# Patient Record
Sex: FEMALE | Race: BLACK OR AFRICAN AMERICAN | NOT HISPANIC OR LATINO | Employment: FULL TIME | URBAN - METROPOLITAN AREA
[De-identification: names, ages, dates, MRNs, and addresses within clinical notes are randomized per-mention and may not be internally consistent; named-entity substitution may affect disease eponyms.]

---

## 2022-11-15 PROBLEM — E66.813 CLASS 3 SEVERE OBESITY WITH SERIOUS COMORBIDITY AND BODY MASS INDEX (BMI) OF 45.0 TO 49.9 IN ADULT: Status: ACTIVE | Noted: 2022-11-15

## 2022-11-15 PROBLEM — R00.0 TACHYCARDIA: Status: ACTIVE | Noted: 2022-11-15

## 2022-11-15 PROBLEM — R06.02 SHORTNESS OF BREATH: Status: ACTIVE | Noted: 2022-11-15

## 2022-11-15 PROBLEM — J45.901 ASTHMA EXACERBATION: Status: ACTIVE | Noted: 2022-11-15

## 2022-11-15 PROBLEM — J06.9 UPPER RESPIRATORY TRACT INFECTION: Status: ACTIVE | Noted: 2022-11-15

## 2022-11-15 PROBLEM — E66.01 CLASS 3 SEVERE OBESITY WITH SERIOUS COMORBIDITY AND BODY MASS INDEX (BMI) OF 45.0 TO 49.9 IN ADULT: Status: ACTIVE | Noted: 2022-11-15

## 2022-11-15 PROBLEM — J96.01 ACUTE RESPIRATORY FAILURE WITH HYPOXIA: Status: ACTIVE | Noted: 2022-11-15

## 2022-11-15 PROBLEM — I10 HYPERTENSION: Status: ACTIVE | Noted: 2022-11-15

## 2022-11-16 PROBLEM — R06.02 SHORTNESS OF BREATH: Status: RESOLVED | Noted: 2022-11-15 | Resolved: 2022-11-16

## 2022-11-16 PROBLEM — R00.0 TACHYCARDIA: Status: RESOLVED | Noted: 2022-11-15 | Resolved: 2022-11-16

## 2022-11-16 PROBLEM — Z71.6 ENCOUNTER FOR TOBACCO USE CESSATION COUNSELING: Status: ACTIVE | Noted: 2022-11-16

## 2022-11-16 PROBLEM — Z72.0 TOBACCO ABUSE: Status: ACTIVE | Noted: 2022-11-16

## 2022-12-19 PROBLEM — H60.90 OTITIS EXTERNA: Status: ACTIVE | Noted: 2022-12-19

## 2022-12-27 PROBLEM — J45.21 MILD INTERMITTENT ASTHMA WITH ACUTE EXACERBATION: Status: ACTIVE | Noted: 2022-12-27

## 2023-02-20 PROBLEM — J96.01 ACUTE RESPIRATORY FAILURE WITH HYPOXIA: Status: RESOLVED | Noted: 2022-11-15 | Resolved: 2023-02-20

## 2023-03-07 PROBLEM — R73.03 PREDIABETES: Status: ACTIVE | Noted: 2023-03-07

## 2023-03-07 PROBLEM — G62.9 NEUROPATHY: Status: ACTIVE | Noted: 2023-03-07

## 2023-08-14 PROBLEM — J45.30 MILD PERSISTENT ASTHMA WITHOUT COMPLICATION: Status: ACTIVE | Noted: 2017-12-19

## 2023-08-31 PROBLEM — J45.901 ASTHMA EXACERBATION: Status: RESOLVED | Noted: 2022-11-15 | Resolved: 2023-08-31

## 2023-08-31 PROBLEM — Z87.891 FORMER SMOKER: Status: ACTIVE | Noted: 2023-08-31

## 2023-08-31 PROBLEM — J45.21 MILD INTERMITTENT ASTHMA WITH ACUTE EXACERBATION: Status: RESOLVED | Noted: 2022-12-27 | Resolved: 2023-08-31

## 2023-08-31 PROBLEM — Z91.09 ENVIRONMENTAL ALLERGIES: Status: ACTIVE | Noted: 2023-08-31

## 2024-01-25 ENCOUNTER — PATIENT MESSAGE (OUTPATIENT)
Dept: ADMINISTRATIVE | Facility: HOSPITAL | Age: 49
End: 2024-01-25

## 2024-02-06 ENCOUNTER — PATIENT OUTREACH (OUTPATIENT)
Dept: ADMINISTRATIVE | Facility: HOSPITAL | Age: 49
End: 2024-02-06

## 2024-04-07 ENCOUNTER — PATIENT MESSAGE (OUTPATIENT)
Dept: ADMINISTRATIVE | Facility: HOSPITAL | Age: 49
End: 2024-04-07

## 2024-05-10 PROBLEM — J32.9 CHRONIC RHINOSINUSITIS: Status: ACTIVE | Noted: 2024-05-10

## 2024-05-10 PROBLEM — J35.9 LESION OF TONSIL: Status: ACTIVE | Noted: 2024-05-10

## 2024-05-10 PROBLEM — J34.3 NASAL TURBINATE HYPERTROPHY: Status: ACTIVE | Noted: 2024-05-10

## 2024-08-20 ENCOUNTER — PATIENT MESSAGE (OUTPATIENT)
Dept: ADMINISTRATIVE | Facility: HOSPITAL | Age: 49
End: 2024-08-20

## 2024-09-16 ENCOUNTER — PATIENT OUTREACH (OUTPATIENT)
Dept: ADMINISTRATIVE | Facility: HOSPITAL | Age: 49
End: 2024-09-16

## 2024-10-24 ENCOUNTER — PATIENT OUTREACH (OUTPATIENT)
Dept: ADMINISTRATIVE | Facility: HOSPITAL | Age: 49
End: 2024-10-24

## 2024-10-25 PROBLEM — F41.9 ANXIETY: Status: ACTIVE | Noted: 2024-10-25

## 2024-10-25 PROBLEM — M54.16 LUMBAR BACK PAIN WITH RADICULOPATHY AFFECTING LOWER EXTREMITY: Status: ACTIVE | Noted: 2024-10-25

## 2024-10-25 PROBLEM — K21.9 GERD (GASTROESOPHAGEAL REFLUX DISEASE): Status: ACTIVE | Noted: 2024-10-25

## 2024-11-19 ENCOUNTER — PATIENT MESSAGE (OUTPATIENT)
Dept: ADMINISTRATIVE | Facility: HOSPITAL | Age: 49
End: 2024-11-19

## 2025-03-10 PROBLEM — G47.33 OSA (OBSTRUCTIVE SLEEP APNEA): Status: ACTIVE | Noted: 2025-03-10

## 2025-03-10 PROBLEM — J30.89 ALLERGIC RHINITIS DUE TO DUST MITE: Status: ACTIVE | Noted: 2025-03-10

## 2025-03-11 PROBLEM — E66.9 OBESITY WITH SERIOUS COMORBIDITY: Status: ACTIVE | Noted: 2025-03-11

## 2025-03-11 PROBLEM — E66.813 CLASS 3 SEVERE OBESITY WITH SERIOUS COMORBIDITY AND BODY MASS INDEX (BMI) OF 50.0 TO 59.9 IN ADULT: Status: ACTIVE | Noted: 2025-03-11

## 2025-03-11 PROBLEM — E66.01 CLASS 3 SEVERE OBESITY WITH SERIOUS COMORBIDITY AND BODY MASS INDEX (BMI) OF 50.0 TO 59.9 IN ADULT: Status: ACTIVE | Noted: 2025-03-11

## 2025-04-24 PROBLEM — G89.29 CHRONIC RIGHT SHOULDER PAIN: Status: ACTIVE | Noted: 2025-04-24

## 2025-04-24 PROBLEM — M25.511 CHRONIC RIGHT SHOULDER PAIN: Status: ACTIVE | Noted: 2025-04-24

## 2025-05-26 ENCOUNTER — TELEPHONE (OUTPATIENT)
Dept: ADMINISTRATIVE | Facility: HOSPITAL | Age: 50
End: 2025-05-26

## 2025-05-26 ENCOUNTER — PATIENT OUTREACH (OUTPATIENT)
Dept: ADMINISTRATIVE | Facility: HOSPITAL | Age: 50
End: 2025-05-26

## 2025-05-26 NOTE — PROGRESS NOTES
5-26-25 follow up on campaign msg regarding colorectal screening, spoke with pt, msg sent to Endo

## 2025-06-04 ENCOUNTER — CLINICAL SUPPORT (OUTPATIENT)
Dept: NUTRITION | Facility: CLINIC | Age: 50
End: 2025-06-04
Payer: MEDICAID

## 2025-06-04 DIAGNOSIS — E66.01 MORBID OBESITY WITH BMI OF 50.0-59.9, ADULT: ICD-10-CM

## 2025-06-04 PROCEDURE — 97802 MEDICAL NUTRITION INDIV IN: CPT | Mod: 95,,,

## 2025-07-07 ENCOUNTER — CLINICAL SUPPORT (OUTPATIENT)
Dept: NUTRITION | Facility: CLINIC | Age: 50
End: 2025-07-07
Payer: MEDICAID

## 2025-07-07 DIAGNOSIS — E66.01 MORBID OBESITY WITH BMI OF 50.0-59.9, ADULT: Primary | ICD-10-CM

## 2025-07-07 PROCEDURE — 97803 MED NUTRITION INDIV SUBSEQ: CPT | Mod: 95,,,

## 2025-07-07 NOTE — PROGRESS NOTES
"Nutrition Assessment    Visit Type: Insurance follow-up  Session Time:  30 Minutes  Reason for MNT visit: Pt in for education and nutrition counseling regarding HTN and Obesity.       Age: 49 y.o.  Wt: 259 #s  Wt Readings from Last 1 Encounters:   06/24/25 116.1 kg (256 lb)      Ht:   Ht Readings from Last 1 Encounters:   06/24/25 4' 8" (1.422 m)     BMI:   BMI Readings from Last 1 Encounters:   06/24/25 57.39 kg/m²                       Wt Readings from Last 10 Encounters:   06/24/25 116.1 kg (256 lb)   06/16/25 115.8 kg (255 lb 6.4 oz)   05/29/25 118.4 kg (261 lb)   05/22/25 118.6 kg (261 lb 6.4 oz)   05/02/25 117.9 kg (259 lb 14.8 oz)   03/11/25 117.9 kg (260 lb)   03/10/25 118.4 kg (261 lb)   02/21/25 119.3 kg (263 lb)   01/06/25 119.3 kg (263 lb)   01/02/25 119.3 kg (263 lb)       Client states:    Healthy lifestyle and weight loss   Last year gained 30 pounds due to lack of physical activity (back issues)   Recent procedure thats now relieving pain   Goal weight: 200 pounds   On metformin//not on zepbound (awaiting medication)      7/7:   Lost 6 pounds   5k steps per day- maximum due to back   States has been mindful of portions  Started orlistat//awaiting GLP-1  Limiting added sugar/saturated   Stated that she is having difficulty affording food//does not qualify for SNAP//discussed local food pantries     Medical History  Problem List           Resolved    Hypertension         Upper respiratory tract infection         Tobacco abuse         Encounter for tobacco use cessation counseling         Otitis externa         Pre-diabetes         Neuropathy         Mild persistent asthma without complication         Environmental allergies         Former smoker         Chronic rhinosinusitis         Nasal turbinate hypertrophy         Lesion of tonsil         Lumbar back pain with radiculopathy affecting lower extremity         Anxiety         GERD (gastroesophageal reflux disease)         IRVIN (obstructive sleep " apnea)         Allergic rhinitis due to dust mite         Class 3 severe obesity with serious comorbidity and body mass index (BMI) of 50.0 to 59.9 in adult         Chronic right shoulder pain            Past Medical History:   Diagnosis Date    Asthma     Diabetes mellitus     Hypertension     Sleep apnea, unspecified        Past Surgical History:   Procedure Laterality Date    FESS, WITH NASAL TURBINATE REDUCTION Bilateral 5/10/2024    Procedure: FESS, WITH NASAL TURBINATE REDUCTION;  Surgeon: Favian Henry MD;  Location: [facility];  Service: ENT;  Laterality: Bilateral;    hysterectomy      partial    INJECTION OF ANESTHETIC AGENT AROUND MEDIAL BRANCH NERVES INNERVATING LUMBAR FACET JOINT Bilateral 12/20/2024    Procedure: Block-nerve-medial branch-lumbar;  Surgeon: Estelle Jimenez MD;  Location: [facility] PERIOP PROCEDURES;  Service: Pain Management;  Laterality: Bilateral;    INJECTION OF ANESTHETIC AGENT AROUND MEDIAL BRANCH NERVES INNERVATING LUMBAR FACET JOINT Bilateral 2/21/2025    Procedure: Block-nerve-medial branch-lumbar;  Surgeon: Estelle Jimenez MD;  Location: [facility] PERIOP PROCEDURES;  Service: Pain Management;  Laterality: Bilateral;  L3-5    RADIOFREQUENCY ABLATION OF LUMBAR MEDIAL BRANCH NERVE AT SINGLE LEVEL Bilateral 5/2/2025    Procedure: Radiofrequency Ablation, Nerve, Spinal, Lumbar, Medial Branch, 1 Level;  Surgeon: Estelle Jimenez MD;  Location: [facility] PERIOP PROCEDURES;  Service: Pain Management;  Laterality: Bilateral;  L3-5        Lab Reports:  Reviewed and noted     Lab Results   Component Value Date    TSH 0.873 06/04/2024    AST 29 04/04/2024    ALT 22 04/04/2024    HDL 44 06/24/2025    TRIG 174 (H) 06/24/2025    HGBA1C 6.1 (H) 01/02/2025    HGBA1C 6.1 (H) 02/21/2024    HGBA1C 5.9 (H) 11/21/2022      BP Readings from Last 1 Encounters:   07/01/25 125/71       Medications    Prior to Admission medications    Medication Sig Start Date End Date Taking? Authorizing  Provider   albuterol (PROVENTIL/VENTOLIN HFA) 90 mcg/actuation inhaler Inhale 2 puffs into the lungs every 4 (four) hours as needed for wheezing or shortness of breath. Rescue 3/11/25   Favian Tabares MD   azelastine (ASTELIN) 137 mcg (0.1 %) nasal spray 1 spray (137 mcg total) in each nostril 2 (two) times daily. 11/20/24 11/20/25  Sana Merino PA   blood pressure test kit-large Kit 1 Device by Misc.(Non-Drug; Combo Route) route once daily. 1/2/25   Favian Tabares MD   busPIRone (BUSPAR) 10 MG tablet Take 1 tablet (10 mg total) by mouth 2 (two) times daily. 3/11/25 3/11/26  Favian Tabares MD   diclofenac sodium (VOLTAREN ARTHRITIS PAIN) 1 % Gel Apply 2 g topically 2 (two) times daily to affected area. 3/16/25 5/24/25  Favian Tabares MD   FLUoxetine 40 MG capsule Take 1 capsule (40 mg total) by mouth once daily. 3/11/25 3/11/26  Favian Tabares MD   fluticasone propion-salmeterol 115-21 mcg/dose (ADVAIR HFA) 115-21 mcg/actuation HFAA inhaler Inhale 2 puffs into the lungs every 12 (twelve) hours. Controller 3/11/25 3/11/26  Favian Tabares MD   fluticasone propionate (FLONASE) 50 mcg/actuation nasal spray 1 spray (50 mcg total) by Each Nostril route 2 (two) times a day.  Patient not taking: Reported on 3/11/2025 3/10/25   Nasim Max MD   hydroCHLOROthiazide 12.5 MG Tab Take 1 tablet (12.5 mg total) by mouth once daily. 3/11/25 3/11/26  Favian Tabares MD   metFORMIN (GLUCOPHAGE-XR) 500 MG ER 24hr tablet Take 1 tablet (500 mg total) by mouth daily with lunch. 3/11/25 3/11/26  Favian Tabares MD   methocarbamoL (ROBAXIN) 500 MG Tab Take 1 tablet (500 mg total) by mouth nightly as needed. 1/6/25   Thad Edmonds MD   omeprazole (PRILOSEC) 40 MG capsule Take 1 capsule (40 mg total) by mouth daily as needed (acid reflux). 1/2/25 7/1/25  Favian Tabares MD   orlistat (XENICAL) 120 mg capsule Take 1 capsule (120 mg total) by mouth 3 (three) times daily with meals. 4/16/25 7/15/25  Rayshawn  MD Favian   tirzepatide, weight loss, (ZEPBOUND) 5 mg/0.5 mL PnIj Inject 5 mg into the skin every 7 days. 4/8/25 6/3/25  Favian Tabares MD        Vitamins, Minerals, and/or Supplements:  Vitamin D, B12  Wants to incorporate more vitamins and minerals.     LIFESTYLE FACTORS    Sleep: good- 6 hours per night   Wears CPAP    Energy Level: Not great    Stress Level: high- life in general  Mom is sick   Not working currently (is ) - on employment    Support System:  parents    Exercise Regimen: lightly active (low intensity, 1-3 days a week)  Apartment gym: Walks at gym (almost everyday) with variable ups and down - 30 minutes  (likes rower, step stepper)    Social History    Marital status:  Single    Social History     Tobacco Use    Smoking status: Former     Current packs/day: 0.50     Average packs/day: 0.5 packs/day for 2.7 years (1.3 ttl pk-yrs)     Types: Cigarettes     Start date: 11/2022     Passive exposure: Past    Smokeless tobacco: Former   Substance Use Topics    Alcohol use: Not Currently     Current Alcohol use: Maybe wine every once in a whole       Meal preparation/shopping:          Dinning out: 1-2 x per week, mostly fast food, mostly take out    Food Allergies or Intolerances:  None      Food Preferences / Dietary Restrictions:  Like to incorporate plant-based sources  of protein//Doesn't eat pork      24-hour Recall:     Reviewed and noted during consultation     Coffee: sugar and cream (powdered creamer)   Recommend: Allulose//Agave     9a Breakfast: Omelette with low-fat cottage cheese + fruit// Non-fat or 2% Fage OR Chobani Zero Sugar + fruit + ha seeds + hard-boiled eggs//Smoothie- frozen kale + unsweetened plant based milk + frozen blueberries+ Chobani Zero sugar yogurt+ Protein powder ( I love Isopure Zero Carb Unflavored) + almond butter + ha seeds// Tofu scramble + unlimited non-starchy vegetables + 100% whole wheat toast// Florin's [place] rolled oats + Chobani Zero Sugar  Vanilla + strawberries + non-dairy milk + almond butter + ha seeds    First meal: 2 or 3p Spaghetti & meatballs (maybe with squash instead of flour)// Tuna fish sandwich// mac & cheese// Smoothie pre-made pack   Recommend lunch 12:00: Canned light tuna (make tuna salad with unsweetened greek yogurt) + 100% whole wheat bread or whole grain bread + marinated cucumbers// Skinless Rotisserie chicken + salad kit (use half of dressing given) + fruit// Xtreme high fiber tortilla + Black beans + unlimited non-starchy vegetables + reduced fat cheese       Snacks often (stress eating at work): chips, hummus, candy   Recommend snack at 3p: Baked chips + Chomps Turkey stick// Hummus + Triscuit thin crisps// Biena Chickpeas// Reduced fat cheese + fruit// Premier protein shake OR OWYN 32g protein shake//Low-fat cottage cheese + fruit    4-5p Dinner: Fast food or foods as above// Hamburger//Fried chicken with french fries & biscuit// vegetables   Recommend dinner at 5-6: 93% lean ground turkey + Xtreme high fiber tortilla + unlimited non-starchy vegetables + reduced fat cheese// [place] + air fried sweet potatoes + unlimited non-starchy vegetables//93% ground beef (made into meatballs or premade) + chickpea pasta & marinara + caesar salad (no croutons & 1 serving size of dressing & parmesan)    Multivitamin: Women's One A Day Multivitamin   Recommend d/c separate vitamin D supplement if combined with multivitamin it exceeds 4000 IU    Beverages:  ounces water (SF flavoring packet)//Green tea       7/7:  1 cup coffee  No Breakfast  Lunch/dinner: Hamburgers & spaghetti (80/20 or 85/150       Beverages:  fluid ounces         Diagnosis    Obesity related to diet/lifestyle as evidenced by BMI 58.52.      Intervention    Estimated Energy Requirements:   Calories: 3423-8612 kcals/d  Protein: 125-145 g/day  Carbohydrates: 145-165g/day  Total Fat: 55-65g/day  Baseline for fluids: 130 fluid ounces or per MD     Recommendations  & Goals:  Patient goals and recommendations are tailored to the specific patient's needs, readiness to change, lifestyle, culture, skills, resources, & abilities. Strategies to help achieve these nutrition-related goals were discussed which can include but are not limited to SMART goal setting & mindful eating.     Aim for a minimum of 7 hours sleep   Exercise 60 minutes most days  Eat breakfast within 1-2 hours of waking up  Try not to skip any meals or snacks, not going more than 3-4 hours without eating   At each meal and snack, try to include a source of fiber + lean protein + healthy fat     Written Materials Provided  These resources are intended to assist the patient in making it easier to choose recommended options when eating out & to identify better-for-you brands at the grocery store:    Meal Planning Guide with recommendations discussed along with portion sizes and a meal plan   Fueling Well On-the-Go Food Guide  Eat Fit Shopping Guide  Lifestyle Nutrition Meal Guide  RD contact information    Action Items:   - 5k steps per day    - Limit added sugar: no more than 20 grams per day   - Fuel every 3-4 hours  - x6 per week The Plate Method   - No more than 1-2x per week fried food       Monitoring/Evaluation    Monitor the following:  Weight  Sleep  Stress Management  Movement  Nutrient intake in reference to meal plan    Communicated with healthcare provider and documented plan for referral to appropriate agency/healthcare provider as needed    Supervising Physician: Deandre Cross MD    Patient motivation, anticipated barriers, expected compliance: Patient is motivated and has verbalized understanding and intent to comply.     Comprehension: good     Follow-up: 4-6 weeks

## 2025-07-07 NOTE — PATIENT INSTRUCTIONS
Name: Yodit Gregory  Date: 07/07/2025      Action Items:  - 5k steps per day    - Limit added sugar: no more than 20 grams per day   - Fuel every 3-4 hours  - x6 per week The Plate Method   - No more than 1-2x per week fried food       Food [employer] [facility]  -- Feeding [place]     Food Pantry -- [facility] Nutrition Department   [phone]  [email or web address]

## 2025-07-29 PROBLEM — J35.9 LESION OF TONSIL: Status: RESOLVED | Noted: 2024-05-10 | Resolved: 2025-07-29

## 2025-07-29 PROBLEM — J30.89 ALLERGIC RHINITIS DUE TO DUST MITE: Status: RESOLVED | Noted: 2025-03-10 | Resolved: 2025-07-29

## 2025-07-29 PROBLEM — Z72.0 TOBACCO ABUSE: Status: RESOLVED | Noted: 2022-11-16 | Resolved: 2025-07-29

## 2025-07-29 PROBLEM — J06.9 UPPER RESPIRATORY TRACT INFECTION: Status: RESOLVED | Noted: 2022-11-15 | Resolved: 2025-07-29

## 2025-07-29 PROBLEM — H60.90 OTITIS EXTERNA: Status: RESOLVED | Noted: 2022-12-19 | Resolved: 2025-07-29

## 2025-07-29 PROBLEM — Z71.6 ENCOUNTER FOR TOBACCO USE CESSATION COUNSELING: Status: RESOLVED | Noted: 2022-11-16 | Resolved: 2025-07-29

## 2025-08-15 PROBLEM — M46.1 SACROILIITIS: Chronic | Status: ACTIVE | Noted: 2025-08-15

## 2025-09-02 ENCOUNTER — CLINICAL SUPPORT (OUTPATIENT)
Dept: NUTRITION | Facility: CLINIC | Age: 50
End: 2025-09-02
Payer: MEDICAID

## 2025-09-02 DIAGNOSIS — E66.01 MORBID OBESITY WITH BMI OF 50.0-59.9, ADULT: Primary | ICD-10-CM

## 2025-09-04 PROBLEM — R13.10 DYSPHAGIA: Status: ACTIVE | Noted: 2025-09-04
